# Patient Record
Sex: MALE | Race: BLACK OR AFRICAN AMERICAN | NOT HISPANIC OR LATINO | Employment: FULL TIME | URBAN - METROPOLITAN AREA
[De-identification: names, ages, dates, MRNs, and addresses within clinical notes are randomized per-mention and may not be internally consistent; named-entity substitution may affect disease eponyms.]

---

## 2023-01-04 ENCOUNTER — HOSPITAL ENCOUNTER (EMERGENCY)
Facility: OTHER | Age: 43
Discharge: HOME | End: 2023-01-04
Attending: EMERGENCY MEDICINE
Payer: COMMERCIAL

## 2023-01-04 VITALS
OXYGEN SATURATION: 98 % | SYSTOLIC BLOOD PRESSURE: 138 MMHG | DIASTOLIC BLOOD PRESSURE: 78 MMHG | WEIGHT: 290 LBS | HEIGHT: 75 IN | RESPIRATION RATE: 16 BRPM | TEMPERATURE: 98 F | HEART RATE: 73 BPM | BODY MASS INDEX: 36.06 KG/M2

## 2023-01-04 DIAGNOSIS — R52 PAIN: ICD-10-CM

## 2023-01-04 DIAGNOSIS — S29.011A INTERCOSTAL MUSCLE STRAIN, INITIAL ENCOUNTER: Primary | ICD-10-CM

## 2023-01-04 PROCEDURE — 25000003 PHARM REV CODE 250: Performed by: PHYSICIAN ASSISTANT

## 2023-01-04 PROCEDURE — 99284 EMERGENCY DEPT VISIT MOD MDM: CPT | Mod: 25

## 2023-01-04 RX ORDER — LIDOCAINE 50 MG/G
1 PATCH TOPICAL
Status: DISCONTINUED | OUTPATIENT
Start: 2023-01-04 | End: 2023-01-04 | Stop reason: HOSPADM

## 2023-01-04 RX ORDER — METHOCARBAMOL 750 MG/1
1500 TABLET, FILM COATED ORAL 3 TIMES DAILY
Qty: 30 TABLET | Refills: 0 | Status: SHIPPED | OUTPATIENT
Start: 2023-01-04 | End: 2023-01-09

## 2023-01-04 RX ORDER — METHOCARBAMOL 750 MG/1
1500 TABLET, FILM COATED ORAL ONCE
Status: COMPLETED | OUTPATIENT
Start: 2023-01-04 | End: 2023-01-04

## 2023-01-04 RX ORDER — KETOROLAC TROMETHAMINE 10 MG/1
10 TABLET, FILM COATED ORAL
Status: COMPLETED | OUTPATIENT
Start: 2023-01-04 | End: 2023-01-04

## 2023-01-04 RX ORDER — KETOROLAC TROMETHAMINE 10 MG/1
10 TABLET, FILM COATED ORAL EVERY 6 HOURS
Qty: 12 TABLET | Refills: 0 | Status: SHIPPED | OUTPATIENT
Start: 2023-01-04 | End: 2023-01-07

## 2023-01-04 RX ORDER — LIDOCAINE 50 MG/G
1 PATCH TOPICAL DAILY
Qty: 15 PATCH | Refills: 0 | Status: SHIPPED | OUTPATIENT
Start: 2023-01-04

## 2023-01-04 RX ADMIN — LIDOCAINE 1 PATCH: 50 PATCH TOPICAL at 06:01

## 2023-01-04 RX ADMIN — METHOCARBAMOL 1500 MG: 750 TABLET ORAL at 06:01

## 2023-01-04 RX ADMIN — KETOROLAC TROMETHAMINE 10 MG: 10 TABLET, FILM COATED ORAL at 06:01

## 2023-01-04 NOTE — Clinical Note
"Christopher Kaye" Aurelio was seen and treated in our emergency department on 1/4/2023.  He may return with limitations on 01/07/2023.  Light duty for 1 week or until cleared by followup     Sincerely,      JAVIER Cui    "

## 2023-01-04 NOTE — FIRST PROVIDER EVALUATION
" Emergency Department TeleTriage Encounter Note      CHIEF COMPLAINT    Chief Complaint   Patient presents with    Flank Pain     " I believe i'm having issues with my spleen" " self diagnoses, like two days ago, I smoke mariajuana, I spoke and cough at the same time, I felt a pain right there" pt reports same episode occurred today, while smoking mariajuana, pt coughed and felt a " pop" to L flank, pt states " felt something pop"        VITAL SIGNS   Initial Vitals [01/04/23 1457]   BP Pulse Resp Temp SpO2   136/73 74 20 -- 97 %      MAP       --            ALLERGIES    Review of patient's allergies indicates:  No Known Allergies    PROVIDER TRIAGE NOTE  Patient presents with complaint of feeling a "pop and pain" to his side that has been intermittent over the last few days. Denied any other complaints.       Phy:   Constitutional: well nourished, well developed, appearing stated age, NAD   HEENT: NCAT, symmetrical lids, No obvious facial deformity.  Normal phonation. Normal Conjunctiva   Neck: NAROM   Respiratory: Normal effort.  No obvious use of accessory muscles   Musculoskeletal: Moved upper extremities well   Neuro: Alert, answers questions appropriately    Psych: appropriate mood and affect      Initial orders will be placed and care will be transferred to an alternate provider when patient is roomed for a full evaluation. Any additional orders and the final disposition will be determined by that provider.        ORDERS  Labs Reviewed - No data to display    ED Orders (720h ago, onward)      None              Virtual Visit Note: The provider triage portion of this emergency department evaluation and documentation was performed via [employer], a HIPAA-compliant telemedicine application, in concert with a tele-presenter in the room. A face to face patient evaluation with one of my colleagues will occur once the patient is placed in an emergency department room.      DISCLAIMER: This note was prepared with " M*Modal voice recognition transcription software. Garbled syntax, mangled pronouns, and other bizarre constructions may be attributed to that software system.

## 2023-01-05 NOTE — ED PROVIDER NOTES
"  Source of History:  Patient     Chief complaint:  Flank Pain (" I believe i'm having issues with my spleen" " self diagnoses, like two days ago, I smoke mariajuana, I spoke and cough at the same time, I felt a pain right there" pt reports same episode occurred today, while smoking mariajuana, pt coughed and felt a " pop" to L flank, pt states " felt something pop" )      HPI:  Christopher Carey is a 42 y.o. male presenting with  left foot pain.  Patient states few days ago he had large coughing episode after smoking were marijuana and had sudden left rib pain.  He states that he felt a pop at that time.  States pain did improve later that day however with any episodes of coughing since this time as ongoing pop and pain.  States last episode was earlier today.  Denies any fever, chills, hemoptysis, abdominal pain, nausea or vomiting.  He is not tried medications for symptoms.  He denies any shortness of breath.  States pain is worse with palpation and certain movements.      This is the extent to the patients complaints today here in the emergency department.    ROS: As per HPI and below:  Constitutional: No fever.  No chills.  Eyes: No visual changes.   ENT: No sore throat. No ear pain.  Urinary: No abnormal urination.  MSK:  Left rib pain  Integument: No rashes or lesions.    Review of patient's allergies indicates:  No Known Allergies    PMH:  As per HPI and below:  History reviewed. No pertinent past medical history.  History reviewed. No pertinent surgical history.    Social History     Tobacco Use    Smoking status: Unknown   Substance Use Topics    Drug use: Yes     Types: Marijuana       Physical Exam:    /73 (BP Location: Left arm, Patient Position: Sitting)   Pulse 74   Temp 98 °F (36.7 °C) (Oral)   Resp 20   Ht 6' 3" (1.905 m)   Wt 131.5 kg (290 lb)   SpO2 97%   BMI 36.25 kg/m²   Nursing note and vital signs reviewed.  Constitutional: No acute distress.  Mild pain with transitioning  Eyes: No " conjunctival injection.  Extraocular muscles are intact.  ENT: Normal phonation.  Cardiac:  Regular rate rhythm.  TTP of the left lateral ribs along the axillary line.  No crepitus, ecchymosis or obvious bony deformity.  No rash noted.  Respiratory:  Lungs CTA  Abdomen:  No abdominal pain with palpation; no guarding, rebound or rigidity.  Protuberant abdomen.  No CVA tenderness.  Musculoskeletal:  No midline spinous tenderness step-off or obvious bony deformity.  See cardiac for additional chest findings  Skin: No rashes seen.  Good turgor.  No abrasions.  No ecchymoses.  Psych: Appropriate, conversant.        I decided to obtain the patient's medical records.    No results found for this or any previous visit.  Imaging Results              X-Ray Ribs 2 View Left (Final result)  Result time 01/04/23 19:27:48      Final result by Jose Raul Mcclellan MD (01/04/23 19:27:48)                   Impression:      1. No convincing acute displaced left rib fracture.      Electronically signed by: Jose Raul Mcclellan MD  Date:    01/04/2023  Time:    19:27               Narrative:    EXAMINATION:  XR RIBS 2 VIEW LEFT    CLINICAL HISTORY:  Pain, unspecified    TECHNIQUE:  Two views of the left ribs were performed.    COMPARISON:  None.    FINDINGS:  Four views left ribs.    No acute displaced left rib fracture.  The left shoulder appears intact.  The visualized left lung zones are clear.                                       X-Ray Chest PA And Lateral (Final result)  Result time 01/04/23 16:28:46      Final result by Jesús Palacio MD (01/04/23 16:28:46)                   Impression:      No acute abnormality.      Electronically signed by: Jesús Palacio MD  Date:    01/04/2023  Time:    16:28               Narrative:    EXAMINATION:  XR CHEST PA AND LATERAL    CLINICAL HISTORY:  Pain, unspecified    TECHNIQUE:  PA and lateral views of the chest were performed.    COMPARISON:  None    FINDINGS:  The lungs are clear, with  normal appearance of pulmonary vasculature and no pleural effusion or pneumothorax.    The cardiac silhouette is normal in size. The hilar and mediastinal contours are unremarkable.    Bones are intact.                                      MDM:    Christopher Carey 42 y.o. presented to the ED with c/o left rib pain.  Physical exam reveals male in no significant distress.  VSS.  Exam notable for  Regular rate rhythm.  TTP of the left lateral ribs along the axillary line.  No crepitus, ecchymosis or obvious bony deformity.  No rash noted.  Lungs are clear to auscultation    Differential Diagnosis includes, but is not limited to:  Fracture, dislocation,strain, f contusion, bleb, pneumothorax, bronchitis, pneumonia, oblique strain, ACS, PE      ED management:  Patient seen in tele triage process were chest x-ray was ordered.  Will add dedicated rib given location of pain.  Imaging unremarkable.  Discussed with patient overall impression is consistent with musculoskeletal etiology as no signs of respiratory distress and reproducible chest wall pain.  Considered PE however low suspicion as he is wells low per ability and PERC score 0.  He did report improvement symptoms in the ED with Toradol, lidocaine patch and Robaxin.  Be sent home with similar medication.  Considered ACS however low suspicion given the reproducible nature and overall symptomatology    Impression/Plan: Patient informed of diagnosis The primary encounter diagnosis was Intercostal muscle strain, initial encounter. A diagnosis of Pain was also pertinent to this visit. Patient will follow up with Primary.  Patient cautioned on when to return to ED.  Pt. Understands and agrees with current treatment plan       Diagnostic Impression:    1. Intercostal muscle strain, initial encounter    2. Pain         ED Disposition Condition    Discharge Stable            ED Prescriptions       Medication Sig Dispense Start Date End Date Auth. Provider    methocarbamoL  (ROBAXIN) 750 MG Tab Take 2 tablets (1,500 mg total) by mouth 3 (three) times daily. for 5 days 30 tablet 1/4/2023 1/9/2023 JAVIER Cui    ketorolac (TORADOL) 10 mg tablet Take 1 tablet (10 mg total) by mouth every 6 (six) hours. for 3 days 12 tablet 1/4/2023 1/7/2023 JAVIER Cui    LIDOcaine (LIDODERM) 5 % Place 1 patch onto the skin once daily. Remove & Discard patch within 12 hours or as directed by MD 15 patch 1/4/2023 -- JAVIER Cui          Follow-up Information       Follow up With Specialties Details Why Contact Info    [facility]  Schedule an appointment as soon as possible for a visit   [address] [facility] [id number] [phone]      [facility] Emergency Dept Emergency Medicine  If symptoms worsen [address] [id number] [phone]            Please pardon typos or dictation errors, as this note was transcribed using M*Modal fluency direct dictation software.      JAVIER Cui  01/04/23 1954

## 2023-01-05 NOTE — ED TRIAGE NOTES
Pt reports to ED with left sided flank pain that started about 3 days ago. He states that he was coughing/stretching and the pain started. Denies urinary symptoms, fever, nausea, or vomiting. Aaox4.